# Patient Record
(demographics unavailable — no encounter records)

---

## 2024-11-06 NOTE — HEALTH RISK ASSESSMENT
[Patient reported colonoscopy was normal] : Patient reported colonoscopy was normal [BoneDensityComments] : Done in office today [ColonoscopyComments] : Rayray cannot get a follow up colonoscopy due to bleeding problems

## 2024-11-06 NOTE — DATA REVIEWED
[FreeTextEntry1] : POCT - A Urinalysis Dip (Automated)             Final  No Documents Attached    	Test  	Result  	Flag	Reference	Goal	Last Verified  	Glucose	Negative	 	 		REQUIRED  	Bilirubin	Negative	 	 		REQUIRED  	Ketone	Negative	 	 		REQUIRED  	Specific Gravity	1.025	 	 		REQUIRED  	Blood	trace-intact	 	 		REQUIRED  	pH	5.5	 	 		REQUIRED  	Protein	Negative	 	 		REQUIRED  	Urobilinogen	0.2 EU/dl	 	 		REQUIRED  	Nitrite	Negative	 	 		REQUIRED  	Leukocytes	Negative	 	 		REQUIRED  	Clarity	Clear	 	 		REQUIRED  	Collection Method	Void	 	 		REQUIRED  Ordered by: GAGE NOBLE       Collected/Examined: 08Oct2024 11:39AM       Verification Required       Stage: Final       Performed at: In Office       Performed by: GAGE NOBLE       Resulted: 08Oct2024 11:39AM       Last Updated: 08Oct2024 11:40AM       _________ Bone density showed osteopenia in spine

## 2024-11-06 NOTE — ASSESSMENT
[FreeTextEntry1] : Rayray should continue taking OtC supplemental calcium and Vitamin D to treat his osteopenia.  Unable to download APAP compliance in office today.  Advised patient to take nocturnal CPAP on a nightly basis for underlying ARIEL.  Medications reviewed. Continue present medications.  Rayray should return in 1 month for sleep follow up.

## 2024-11-06 NOTE — HISTORY OF PRESENT ILLNESS
[TextBox_4] : RAYRAY HUNG is a 73 year old male, with history of GERD, asthma and hypercholesterolemia who presents to the office for follow up evaluation. Rayray is feeling well overall. Rayray notes that he has a dry cough that he attributes to PND. Rayray notes that he will soon visit his ophthalmologist for his vision issues, the neurologist to monitor his aneurysm, and cardiologist to monitor his cardiac state. Rayray notes that he urinates frequently. Rayray notes that he is on Eliquis.

## 2024-11-06 NOTE — ADDENDUM
[FreeTextEntry1] : I, Germán Del Real, acted solely as a scribe for Dr. Marisa Petit D.O. on this date 11/05/2024.   All medical record entries made by the Scribe were at my, Dr. Marisa Petit D.O., direction and personally dictated by me on 11/05/2024. I have reviewed the chart and agree that the record accurately reflects my personal performance of the history, physical exam, assessment and plan. I have also personally directed, reviewed, and agreed with the chart.

## 2024-11-06 NOTE — HEALTH RISK ASSESSMENT
No [Patient reported colonoscopy was normal] : Patient reported colonoscopy was normal [BoneDensityComments] : Done in office today [ColonoscopyComments] : Rayray cannot get a follow up colonoscopy due to bleeding problems

## 2024-11-06 NOTE — HISTORY OF PRESENT ILLNESS
[TextBox_4] : RAYRAY HUNG is a 73 year old male, with history of GERD, asthma and hypercholesterolemia who presents to the office for follow up evaluation. Rayrya is feeling well overall. Rayray notes that he has a dry cough that he attributes to PND. Rayray notes that he will soon visit his ophthalmologist for his vision issues, the neurologist to monitor his aneurysm, and cardiologist to monitor his cardiac state. Rayray notes that he urinates frequently. Rayray notes that he is on Eliquis.

## 2024-12-04 NOTE — ADDENDUM
[FreeTextEntry1] : I, Germán Del Real, acted solely as a scribe for Dr. Marisa Petit D.O. on this date 12/03/2024.   All medical record entries made by the Scribe were at my, Dr. Marisa Petit D.O., direction and personally dictated by me on 12/03/2024. I have reviewed the chart and agree that the record accurately reflects my personal performance of the history, physical exam, assessment and plan. I have also personally directed, reviewed, and agreed with the chart.

## 2024-12-04 NOTE — HISTORY OF PRESENT ILLNESS
[TextBox_4] : RAYRAY HUNG is a 73 year old male, with history of GERD, asthma and hypercholesterolemia who presents to the office for follow up evaluation. Rayray is feeling well overall though he complains of a persistent dry cough that he attributes to PND. Rayray notes that he urinates frequently. Rayray notes that he is on Eliquis.

## 2024-12-04 NOTE — ASSESSMENT
[FreeTextEntry1] : Rayray should continue taking OtC supplemental calcium and Vitamin D to treat his osteopenia.  Prevnar 20 vaccine given today.  Unable to download APAP compliance in office today.  Advised patient to take nocturnal CPAP on a nightly basis for underlying ARIEL.  Medications reviewed. Continue present medications.  Rayray should return in 6 weeks for sleep follow up.

## 2025-01-15 NOTE — ASSESSMENT
[FreeTextEntry1] : 75 yo pt presents in office for sleep follow up.  - Rayray should continue taking OtC supplemental calcium and Vitamin D to treat his osteopenia. - Medications reviewed. Continue present medications.  ARIEL - Pulmonary sleep report reviewed in office today - Pt is compliant with PAP machine usage and is benefitting from treatment - Auto-titrating Positive Airway Pressure(APAP) compliance reviewed with patient in office today. - Patient is compliant and benefiting from APAP usage.  AFIB - Recommended discontinuation of aspirin to decrease bleeding. -Continue Eliquis. - Recommended pt to f/u with cardio for AFIB

## 2025-01-15 NOTE — END OF VISIT
[FreeTextEntry3] : I, Shaan Tang, acted as a scribe on behalf of Dr. Marisa Petit D.O. on 01/14/2025.   All medical entries made by the scribe were at my, Dr. Marisa Petit D.O., direction and personally dictated by me on 01/14/2025 . I have reviewed the chart and agree that the record accurately reflects my personal performance of the history, physical exam, assessment and plan. I have also personally directed, reviewed, and agreed with the chart.

## 2025-01-15 NOTE — HISTORY OF PRESENT ILLNESS
[TextBox_4] : 73 yo pt presents in office for sleep follow up. Hx of GERD, asthma, hypercholesterolemia, and PND. s/p Prevnar 20 vaccine. Overall feeling well; no respiratory complaints reported at this time; using Fluticasone inhaler, and albuterol as needed. Using CPAP on a continuous basis Pt reports dx of afib and follows with cardiologist. His frequency of periods without breathing have increased as noted in pulmonary sleep report.  States that he is experiencing bleeding from blood thinners. Eliquis dosage was halved. Pt has decreased aspirin dosage at [facility].  He was THEN referred to Dr. Maldonado who suggested stopping aspirin if he is already taking Eliquis. Pt inquires if this course of action is best to manage his bleeding.

## 2025-01-15 NOTE — HISTORY OF PRESENT ILLNESS
[TextBox_4] : 75 yo pt presents in office for sleep follow up. Hx of GERD, asthma, hypercholesterolemia, and PND. s/p Prevnar 20 vaccine. Overall feeling well; no respiratory complaints reported at this time; using Fluticasone inhaler, and albuterol as needed. Using CPAP on a continuous basis Pt reports dx of afib and follows with cardiologist. His frequency of periods without breathing have increased as noted in pulmonary sleep report.  States that he is experiencing bleeding from blood thinners. Eliquis dosage was halved. Pt has decreased aspirin dosage at [facility].  He was THEN referred to Dr. Maldonado who suggested stopping aspirin if he is already taking Eliquis. Pt inquires if this course of action is best to manage his bleeding.

## 2025-02-28 NOTE — DATA REVIEWED
[FreeTextEntry1] :  Xray Chest 2 Views PA/Lat             Final  No Documents Attached    	 Chest x-ray PA and lateral views performed in my office today showed s/p loop recorder, clear lungs, no evidence of infiltrates or pleural effusions.  Ordered by: GAGE NOBLE       Collected/Examined: [id number] 10:30AM       Verification Required       Stage: Final       Performed at: In Office       Performed by: GAGE NOBLE       Resulted: [id number] 08:14AM       Last Updated: [id number] 08:15AM

## 2025-02-28 NOTE — PLAN
[FreeTextEntry1] : Auto-titrating Positive Airway Pressure(APAP) compliance reviewed with patient in office today. Patient is compliant and benefiting from APAP usage. Venipuncture with labs drawn in office today. Obtained and reviewed CXR results in office with patient today.  Advised patient to discontinue Gabapentin at this point.  Start taking Pregabalin 100mg, one capsule BID, for atypical chest pain/back pain/probable fibromyalgia.  Continue APAP on a nightly basis for sleep apnea treatment.  Continue Eliquis as prescribed for history of A-Fib.  Continue to follow with cardiologist, Dr. Banks, for history of A-Fib and anticoagulant usage.  Return for follow up in 1 month.

## 2025-02-28 NOTE — REVIEW OF SYSTEMS
[Back Pain] : back pain [Negative] : Heme/Lymph [FreeTextEntry9] : right sided back pain, worse with coughing

## 2025-02-28 NOTE — HISTORY OF PRESENT ILLNESS
[FreeTextEntry1] : follow up.  [de-identified] : COREY HUNG is a 73 year old male, with history of GERD, asthma, ARIEL, A-Fib s/p loop recorder on Eliquis and ASA 81mg, hypercholesterolemia who presents to the office today for follow up visit. Corey notes that he continues to be compliant with using PAP on a nightly basis for sleep apnea. He also reports that his Eliquis dose was recently decreased to 2.5mg due to episodes of bleeding; he is closely followed by cardiologist, Dr. Banks. At this time, Corey is c/o right sided chest pain that radiates to his back and is mostly exacerbated when he coughs. This pain has been present for the last 2 months. Denies wheezing, dyspnea, LE weakness, urinary/bowel incontinence, saddle anesthesia. Corey is on Gabapentin 100mg daily in the AM; he was prescribed 100mg BID per ortho but only takes it once daily.

## 2025-02-28 NOTE — DATA REVIEWED
[FreeTextEntry1] :  Xray Chest 2 Views PA/Lat             Final  No Documents Attached    	 Chest x-ray PA and lateral views performed in my office today showed s/p loop recorder, clear lungs, no evidence of infiltrates or pleural effusions.  Ordered by: GAGE NOBLE       Collected/Examined: [id number] 10:30AM       Verification Required       Stage: Final       Performed at: In Office       Performed by: GAGE NOBLE       Resulted: [id number] 08:14AM       Last Updated: [id number] 08:15AM

## 2025-02-28 NOTE — ASSESSMENT
[FreeTextEntry1] : Obtained and reviewed CXR results in office with patient today.  CXR was unremarkable.   Venipuncture with labs drawn in office today, including D-dimer to rule out PE/DVT, and elucidate the exact etiology of VINCHOA's atypical chest pain.  Atypical chest pain likely secondary to fibromyalgia from anxiety.  Auto-titrating Positive Airway Pressure(APAP) compliance data downloaded and reviewed with patient in office today. Patient is compliant and benefiting from APAP usage.

## 2025-02-28 NOTE — HISTORY OF PRESENT ILLNESS
[FreeTextEntry1] : follow up.  [de-identified] : COREY HUNG is a 73 year old male, with history of GERD, asthma, ARIEL, A-Fib s/p loop recorder on Eliquis and ASA 81mg, hypercholesterolemia who presents to the office today for follow up visit. Corey notes that he continues to be compliant with using PAP on a nightly basis for sleep apnea. He also reports that his Eliquis dose was recently decreased to 2.5mg due to episodes of bleeding; he is closely followed by cardiologist, Dr. Banks. At this time, Corey is c/o right sided chest pain that radiates to his back and is mostly exacerbated when he coughs. This pain has been present for the last 2 months. Denies wheezing, dyspnea, LE weakness, urinary/bowel incontinence, saddle anesthesia. Corey is on Gabapentin 100mg daily in the AM; he was prescribed 100mg BID per ortho but only takes it once daily.

## 2025-02-28 NOTE — SIGNATURES
[TextEntry] : This note was written by Miah Richardson on 02/27/2025 acting as medical scribe for Dr. Marisa Petit. I, Dr. Marisa Petit, have read and attest that all the information, medical decision making and discharge instructions within are true and accurate.

## 2025-04-05 NOTE — PLAN
[FreeTextEntry1] : Auto-titrating Positive Airway Pressure(APAP) compliance reviewed with patient in office today. Patient is compliant and benefiting from APAP usage. Continue using APAP treatment for Obstructive sleep apnea. Increase gabapentin for back pain/joint pain.   Continue Eliquis and Aspirin Continue Crestor 10 mg daily for hypercholesterolemia, in light of TIA. Neurology follow-up.   Return for follow up in 1 month.

## 2025-04-05 NOTE — HISTORY OF PRESENT ILLNESS
[FreeTextEntry1] : Mild fatigue [de-identified] : Overall feeling well, no complaints at this time

## 2025-04-09 NOTE — ASSESSMENT
[FreeTextEntry1] : Pt seen in drs office for a CPAP I & M. Vitals taken and were stable at this time. Device SN: [id number] Pt using DreamStation 2 PAP device and it is currently set at 4-14 cm H2O.  Problem- Pt. chief complaint is that machine is making loud noises during inspiration and expiration. Resolution- Checked system for any possible leaks and noticed that humidifier chamber is a little loose and causes slight gap btw chamber and machine.  Explained to pt. that if chamber isn't properly secured or lid is slightly open, machine will have a leak and cause loud whooshing noises that pt. was referring to.  Therapy was initiated in office w/ chamber properly connected and pt. stated that the noise he was hearing was no longer happening.  Pt. will check system for leaks before usage and call if problem persists.   Pt was shown how to use their machine and supplies and they were able to return demonstrate usage. They also were instructed on how to clean all supplies and how to obtain new ones.

## 2025-05-14 NOTE — ASSESSMENT
[FreeTextEntry1] : Auto-titrating Positive Airway Pressure(APAP) compliance reviewed with patient in office today. Patient is compliant and benefiting from APAP usage.

## 2025-05-14 NOTE — HISTORY OF PRESENT ILLNESS
[FreeTextEntry1] : Follow up.  [de-identified] : COREY HUNG is a 73 year old male, with history of GERD, asthma, ARIEL, A-Fib s/p loop recorder on Eliquis and ASA 81mg, hypercholesterolemia who presents to the office today for follow up visit. Corey reports that he was sick about a month ago with intermittent sore throat and phlegm productive which have now resolved. He also notes that he took Methylprednisone for 2 weeks due to back pain which has now improved. He does continue to take gabapentin for back pain. The patient reports to be overall feeling reasonably well at this time with no complaints. Corey has been using his his PAP machine on a nightly basis.

## 2025-05-14 NOTE — SIGNATURES
[TextEntry] : This note was written by Miah Richardson on 05/14/2025 acting as medical scribe for Dr. Marisa Petit. I, Dr. Marisa Petit, have read and attest that all the information, medical decision making and discharge instructions within are true and accurate.

## 2025-05-14 NOTE — PLAN
[FreeTextEntry1] : Auto-titrating Positive Airway Pressure(APAP) compliance reviewed with patient in office today. Continue using APAP treatment for Obstructive sleep apnea. Continue gabapentin for back pain/joint pain.   Continue Eliquis and Aspirin for history of A-Fib.  Continue Crestor 10 mg daily for hypercholesterolemia, in light of TIA. Start Flonase nasal spray for postnasal drip.   Medications reviewed. Continue present medications. Return for follow up in 6 weeks.

## 2025-06-24 NOTE — ADDENDUM
[FreeTextEntry1] : I, Nubia Mendoza, acted solely as a scribe for Dr. Marisa Petit D.O. on this date 06/24/2025.  All medical record entries made by the Scribe were at my, Dr. Mraisa Petit D.O., direction and personally dictated by me on 06/24/2025. I have reviewed the chart and agree that the record accurately reflects my personal performance of the history, physical exam, assessment and plan. I have also personally directed, reviewed, and agreed with the chart.

## 2025-06-24 NOTE — HISTORY OF PRESENT ILLNESS
[FreeTextEntry1] : Follow up. Back pain [de-identified] : COREY HUNG is a 74 year old male, with history of GERD, asthma, ARIEL, A-Fib s/p loop recorder on Eliquis and ASA 81mg, hypercholesterolemia who presents to the office today for follow up visit. COREY notes that he took Methylprednisone for 2 weeks due to back pain which improved, however it has progressed and feels now worse. He does continue to take gabapentin for back pain. The patient reports to be overall feeling reasonably well at this time. Corey has been using his PAP machine on a nightly basis.

## 2025-06-24 NOTE — ADDENDUM
[FreeTextEntry1] : I, Nubia Mendoza, acted solely as a scribe for Dr. Marisa Petit D.O. on this date 06/24/2025.  All medical record entries made by the Scribe were at my, Dr. Marisa Petit D.O., direction and personally dictated by me on 06/24/2025. I have reviewed the chart and agree that the record accurately reflects my personal performance of the history, physical exam, assessment and plan. I have also personally directed, reviewed, and agreed with the chart.

## 2025-06-24 NOTE — HISTORY OF PRESENT ILLNESS
[FreeTextEntry1] : Follow up. Back pain [de-identified] : COREY HUNG is a 74 year old male, with history of GERD, asthma, ARIEL, A-Fib s/p loop recorder on Eliquis and ASA 81mg, hypercholesterolemia who presents to the office today for follow up visit. COREY notes that he took Methylprednisone for 2 weeks due to back pain which improved, however it has progressed and feels now worse. He does continue to take gabapentin for back pain. The patient reports to be overall feeling reasonably well at this time. Corey has been using his PAP machine on a nightly basis.

## 2025-06-24 NOTE — PLAN
[FreeTextEntry1] : Continue using APAP treatment for Obstructive sleep apnea. Should increase his Gabapentin dosage to 200mg dailyfor back pain/joint pain.   Continue Eliquis and Aspirin for history of A-Fib.  Continue Crestor 10 mg daily for hypercholesterolemia, in light of TIA. Continue Flonase nasal spray for postnasal drip.   Medications reviewed. Continue present medications. COREY should bring his APAP chip to next visit  Return for follow up in 6 weeks.

## 2025-06-24 NOTE — ASSESSMENT
[FreeTextEntry1] : PAP data download not available at today's visit- COREY did not bring his PAP machine today

## 2025-07-20 NOTE — HISTORY OF PRESENT ILLNESS
[FreeTextEntry1] : Follow up [de-identified] : COREY HUNG is a 74 year-old male who presents to the office today for follow up visit. Patient reports he had an accident on the 4th of july, he was taking the stairs down from a deck, missed a step and fell down the stairs, landing on his chest and stomach. He hurt his knees badly and [facility] took a Chest CT scan which showed lung nodules. He also had an MRI done on his knees.

## 2025-07-20 NOTE — ASSESSMENT
[FreeTextEntry1] : Venipuncture with labs drawn in office today.  Reviewed chest CT scan result on July 13, 2025 with patient in the office today.  Dr. Petit's recommendation: At this point, Rayray should undergo a follow up chest CT scan in 6 months. I advised Rayray to visit his GI doctor due to blood in his stool and positive hemoglobin. Return for follow up in 3 weeks unless issues arise sooner.  Medications reviewed. Continue present medications. Auto-titrating Positive Airway Pressure(APAP) compliance reviewed with patient in office today. Patient is compliant and benefiting from APAP usage.

## 2025-07-20 NOTE — SIGNATURES
[TextEntry] : This note was written by Norm Isaac on 07/18/2025 acting as medical scribe for Dr. Marisa Petit. I, Dr. Marisa Petit, have read and attest that all the information, medical decision making and discharge instructions within are true and accurate.

## 2025-07-20 NOTE — HISTORY OF PRESENT ILLNESS
[FreeTextEntry1] : Follow up [de-identified] : COREY HUNG is a 74 year-old male who presents to the office today for follow up visit. Patient reports he had an accident on the 4th of july, he was taking the stairs down from a deck, missed a step and fell down the stairs, landing on his chest and stomach. He hurt his knees badly and [facility] took a Chest CT scan which showed lung nodules. He also had an MRI done on his knees.